# Patient Record
Sex: FEMALE | URBAN - METROPOLITAN AREA
[De-identification: names, ages, dates, MRNs, and addresses within clinical notes are randomized per-mention and may not be internally consistent; named-entity substitution may affect disease eponyms.]

---

## 2020-06-29 ENCOUNTER — OFFICE VISIT (OUTPATIENT)
Dept: PRIMARY CARE CLINIC | Age: 25
End: 2020-06-29

## 2020-06-29 PROCEDURE — 99213 OFFICE O/P EST LOW 20 MIN: CPT | Performed by: NURSE PRACTITIONER

## 2020-06-29 NOTE — PROGRESS NOTES
Fear of current or ex partner: Not on file     Emotionally abused: Not on file     Physically abused: Not on file     Forced sexual activity: Not on file   Other Topics Concern    Not on file   Social History Narrative    Not on file     No family history on file. No results found for any previous visit. RISK FACTORS:  []Pregnancy   []Diabetes  []Heart disease  []Asthma  []COPD/Other chronic lung diseases  []Active Cancer/Chemotherapy   []Taking oral steroids  [x]Close contact with a lab confirmed COVID-19 patient within 14 days of symptom onset  []Health Care Worker Exposure no symptoms  []Health Care Worker Exposure symptomatic    Assessment  Physical Exam    Constitutional:       Appearance: Normal appearance. HENT:      Head: Normocephalic and atraumatic. Mouth/Throat:      Mouth: Mucous membranes are moist.   Neck:      Musculoskeletal: Normal range of motion. Cardiovascular:     Skin pink and warm     Pulmonary:      Effort: Pulmonary effort is normal.   Musculoskeletal: Normal range of motion. Skin:     General: Skin is warm and dry. Neurological:      General: No focal deficit present. Mental Status: Alert. Mental status is at baseline. Test ordered:    [x]COVID    _________  []Strep    ___________      Diagnosis and Plan:      Diagnosis Orders   1. Suspected COVID-19 virus infection  Covid-19 Ambulatory     COVID-19 swab complete at clinic and sent to lab for testing. Quarantine order in place, patient verbalized understanding. Preventing the spread of Coronavirus, Possible COVID-19 exposure with self-quarantine, isolation instructions and management of symptoms, and to follow-up with primary care physician or emergency department if symptomatic complaints worsen.

## 2020-07-03 LAB
SARS-COV-2: NOT DETECTED
SOURCE: NORMAL